# Patient Record
Sex: FEMALE | Race: OTHER | URBAN - METROPOLITAN AREA
[De-identification: names, ages, dates, MRNs, and addresses within clinical notes are randomized per-mention and may not be internally consistent; named-entity substitution may affect disease eponyms.]

---

## 2023-06-07 ENCOUNTER — EMERGENCY (EMERGENCY)
Facility: HOSPITAL | Age: 48
LOS: 0 days | Discharge: HOME | End: 2023-06-07
Payer: MEDICAID

## 2023-06-07 VITALS
HEART RATE: 86 BPM | OXYGEN SATURATION: 99 % | DIASTOLIC BLOOD PRESSURE: 86 MMHG | TEMPERATURE: 100 F | RESPIRATION RATE: 16 BRPM | SYSTOLIC BLOOD PRESSURE: 125 MMHG

## 2023-06-07 VITALS
HEART RATE: 81 BPM | OXYGEN SATURATION: 98 % | DIASTOLIC BLOOD PRESSURE: 88 MMHG | WEIGHT: 214.95 LBS | TEMPERATURE: 99 F | SYSTOLIC BLOOD PRESSURE: 116 MMHG | RESPIRATION RATE: 18 BRPM

## 2023-06-07 DIAGNOSIS — M25.471 EFFUSION, RIGHT ANKLE: ICD-10-CM
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
## 2023-06-07 DIAGNOSIS — Z88.6 ALLERGY STATUS TO ANALGESIC AGENT: ICD-10-CM
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
## 2023-06-07 DIAGNOSIS — M25.571 PAIN IN RIGHT ANKLE AND JOINTS OF RIGHT FOOT: ICD-10-CM

## 2023-06-07 DIAGNOSIS — S82.841A DISPLACED BIMALLEOLAR FRACTURE OF RIGHT LOWER LEG, INITIAL ENCOUNTER FOR CLOSED FRACTURE: ICD-10-CM

## 2023-06-07 DIAGNOSIS — F32.A DEPRESSION, UNSPECIFIED: ICD-10-CM

## 2023-06-07 DIAGNOSIS — Z90.711 ACQUIRED ABSENCE OF UTERUS WITH REMAINING CERVICAL STUMP: Chronic | ICD-10-CM

## 2023-06-07 DIAGNOSIS — Z90.710 ACQUIRED ABSENCE OF BOTH CERVIX AND UTERUS: ICD-10-CM

## 2023-06-07 DIAGNOSIS — Z87.42 PERSONAL HISTORY OF OTHER DISEASES OF THE FEMALE GENITAL TRACT: ICD-10-CM
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
## 2023-06-07 PROCEDURE — 73590 X-RAY EXAM OF LOWER LEG: CPT | Mod: 26,RT,77

## 2023-06-07 PROCEDURE — 73590 X-RAY EXAM OF LOWER LEG: CPT | Mod: 26,RT

## 2023-06-07 PROCEDURE — 73610 X-RAY EXAM OF ANKLE: CPT | Mod: 26,76,RT,77

## 2023-06-07 PROCEDURE — 73610 X-RAY EXAM OF ANKLE: CPT | Mod: 26,RT

## 2023-06-07 PROCEDURE — 99285 EMERGENCY DEPT VISIT HI MDM: CPT

## 2023-06-07 PROCEDURE — 73630 X-RAY EXAM OF FOOT: CPT | Mod: 26,RT

## 2023-06-07 RX ORDER — OXYCODONE AND ACETAMINOPHEN 5; 325 MG/1; MG/1
1 TABLET ORAL
Qty: 12 | Refills: 0
Start: 2023-06-07 | End: 2023-06-09

## 2023-06-07 NOTE — ED ADULT NURSE NOTE - NSFALLUNIVINTERV_ED_ALL_ED
Bed/Stretcher in lowest position, wheels locked, appropriate side rails in place/Call bell, personal items and telephone in reach/Instruct patient to call for assistance before getting out of bed/chair/stretcher/Non-slip footwear applied when patient is off stretcher/[place] to call system/Physically safe environment - no spills, clutter or unnecessary equipment/Purposeful proactive rounding/Room/bathroom lighting operational, light cord in reach

## 2023-06-07 NOTE — ED ADULT NURSE NOTE - ED STAT RN HANDOFF DETAILS
Report endorsed to onctroy Aranda RN. Safety checks compld this shift/Safety rounds completed hourly.  IV sites checked Q2+remains WDL. Meds given as ord with no s/s of adverse RXNs. Fall +skin precs in place. Any issues endorsed to onctroy RN for follow up.

## 2023-06-07 NOTE — ED PROVIDER NOTE - CLINICAL SUMMARY MEDICAL DECISION MAKING FREE TEXT BOX
49 y/o female with depression, hysterectomy presents with right ankle injury today. Will get XR r/o fracture or dislocation. Percocet ordered for pain. 49 y/o female with depression, hysterectomy presents with right ankle injury today. Will get XR r/o fracture or dislocation. Percocet ordered for pain.    Pt was seen by ortho and splinted and pt can be discharged home and follow up with outpatient ortho. Nonweight bearing of the right leg. Apply ice and elevate.   Rx percocet sent to pharmacy.

## 2023-06-07 NOTE — ED ADULT NURSE NOTE - OBJECTIVE STATEMENT
a&ox4. patient C/O  s/p fall right ankle pain. + pulses patient states fell downstairs.  denies head trauma no blurry vision/ CP/SOB at this time. NO PMH right ankle swelling noted unable to apply pressure

## 2023-06-07 NOTE — ED ADULT TRIAGE NOTE - CHIEF COMPLAINT QUOTE
biba from home c/o r ankle pain s/p fall down 4 stairs denies head injury or loc slight swelling no deformity noted r toes warm pink and mobile with + sensation and brisk capillary refill

## 2023-06-07 NOTE — CONSULT NOTE ADULT - SUBJECTIVE AND OBJECTIVE BOX
48y Female community ambulatory presents c/o R ankle pain sp mechanical fall. Pt tripped on the stairs attempting to "help a squirrel". Denies HS/LOC. Denies numbness/tingling. No other pain/injuries. Denies fevers/chills.     HEALTH ISSUES - PROBLEM Dx:        MEDICATIONS  (STANDING):    Allergies    aspirin (Vomiting; Faint; Hives)    Intolerances                  Vital Signs Last 24 Hrs  T(C): 37 (06-07-23 @ 13:10), Max: 37 (06-07-23 @ 13:10)  T(F): 98.6 (06-07-23 @ 13:10), Max: 98.6 (06-07-23 @ 13:10)  HR: 81 (06-07-23 @ 13:10) (81 - 81)  BP: 116/88 (06-07-23 @ 13:10) (116/88 - 116/88)  BP(mean): --  RR: 18 (06-07-23 @ 13:10) (18 - 18)  SpO2: 98% (06-07-23 @ 13:10) (98% - 98%)    Imaging: XR demonstrates R matt equivalent ankle fracture    Physical Exam  Gen: Nad  R LE: Skin intact, +TTP medial/lateral malleolus, no bony ttp elsewhere, +ehl/fhl/ta/gs function, L3-S1 SILT, dp/pt pulse intact, negative log roll, able to SLR, compartments soft/compressive, extremity warm/well perfused  Secondary Survey: No TTP bony prominences with full AROM at baseline per patient, SILT diffusely, Capillary refill brisk, compartments soft and compressible, able to SLR with contralateral leg, no ttp axial spine.     Procedure: 10 cc 2% lidocaine injected under sterile procedure into L/R ankle joint. Closed reduction performed. Placed in well padded trilam splint. Post procedure imaging obtained. Post procedure exam unchanged, NV intact, able to move all toes, SILT distally.     A/P: 48y Female with R bimalleolar equivalent ankle fracture  Pain control  NWB RLE in splint, keep c/d/I, cane/crutches/walker as needed  Ice/elevation  Si/sx compartment syndrome discussed with patient, told to return to ED if exhibit any  Possible need for surgical intervention in future discussed, all questions answered  Follow up with Dr. Hernandez within 1 week, call office for appointment  Ortho stable

## 2023-06-07 NOTE — ED PROVIDER NOTE - CARE PROVIDER_API CALL
Colten Hernandez  Orthopaedic Surgery  [address], [address]  [place] [address]  Phone: [phone]  Fax: [phone]  Follow Up Time: 4-6 Days

## 2023-06-07 NOTE — ED PROVIDER NOTE - OBJECTIVE STATEMENT
49 y/o female with 49 y/o female with depression, fibroids, partial hysterectomy presents with right ankle injury sustained today. Pt states she was walking down stairs, slipped and fell down 3 stairs twisting her right ankle. Denies head trauma, LOC. Pt reports having pain and swelling and unable to bear weight on it. Denies numbness, tingling, or other injuries. Pt is not on any blood thinners.

## 2023-06-07 NOTE — ED PROVIDER NOTE - PHYSICAL EXAMINATION
GEN: Awake, alert, interactive, NAD.  HEAD AND NECK: NC/AT. Airway patent. Neck supple.   EYES:  Clear b/l. EOMI. PERRL.   ENT: Moist mucus membranes.   CARDIAC: Regular rate, regular rhythm. No evident pedal edema.    RESP/CHEST: Normal respiratory effort with no use of accessory muscles or retractions. Clear throughout on auscultation.  ABD: soft, non-distended, non-tender. No rebound, no guarding.   BACK: No midline spinal TTP. No CVAT.   EXTREMITIES: RLE: (+) deformity, (+) swelling to the right lateral malleous, (+) pain with range of motion, (+) FROM of the knee, toes. (+) pulses intact. Moving all other extremities with no apparent deformities.   SKIN: Warm, dry, intact normal color. No rash.   NEURO: AOx3, CN II-XII grossly intact, no focal deficits.   PSYCH: Appropriate mood and affect.

## 2023-06-07 NOTE — ED PROVIDER NOTE - NSFOLLOWUPINSTRUCTIONS_ED_ALL_ED_FT
APPLY ICE AND ELEVATE,   NONWEIGHT BEARING OF THE RIGHT LEG WITH CRUTCHES. KEEP SPLINT CLEAN AND DRY. Rest, drink plenty of fluids.  Advance activity as tolerated.  Continue all previously prescribed medications as directed.  Follow up with ortho this week- bring copies of your results.  Return to the ER for worsening or persistent symptoms, and/or ANY NEW OR CONCERNING SYMPTOMS. If you have issues obtaining follow up, please call: [phone] to obtain a doctor or specialist who takes your insurance in your area.  You may call [phone] to make an appointment with the internal medicine clinic.

## 2023-06-07 NOTE — ED PROVIDER NOTE - NS ED ROS FT
CONSTITUTIONAL: No fever, no chills, no fatigue  EYES: No visual changes  ENT: No ear pain, no sore throat  CARDIOVASCULAR: No chest pain, no palpitations  RESPIRATORY: No cough, no SOB  GI: No abdominal pain, no nausea, no vomiting, no constipation, no diarrhea  GENITOURINARY: No dysuria, no frequency, no hematuria  MUSKULOSKELETAL: No back pain, no myalgias  SKIN: No rash  NEURO: No headache    ALL OTHER SYSTEMS NEGATIVE.

## 2023-06-07 NOTE — ED PROVIDER NOTE - NS_EDPROVIDERDISPOUSERTYPE_ED_A_ED
normal, alert, in no acute distress, well developed, well nourished, ambulating without difficulty, normal communication ability
I have personally evaluated and examined the patient. The Attending was available to me as a supervising provider if needed.

## 2023-06-07 NOTE — ED PROVIDER NOTE - PATIENT PORTAL LINK FT
You can access the FollowMyHealth Patient Portal offered by [facility] by registering at the following website: [email or web address]. By joining [facility]’s FollowMyHealth portal, you will also be able to view your health information using other applications (apps) compatible with our system.